# Patient Record
Sex: FEMALE | Race: WHITE | NOT HISPANIC OR LATINO | URBAN - METROPOLITAN AREA
[De-identification: names, ages, dates, MRNs, and addresses within clinical notes are randomized per-mention and may not be internally consistent; named-entity substitution may affect disease eponyms.]

---

## 2020-11-18 ENCOUNTER — OFFICE VISIT (OUTPATIENT)
Dept: URBAN - METROPOLITAN AREA CLINIC 70 | Facility: CLINIC | Age: 40
End: 2020-11-18
Payer: COMMERCIAL

## 2020-11-18 ENCOUNTER — WEB ENCOUNTER (OUTPATIENT)
Dept: URBAN - METROPOLITAN AREA CLINIC 70 | Facility: CLINIC | Age: 40
End: 2020-11-18

## 2020-11-18 DIAGNOSIS — R11.0 NAUSEA ALONE: ICD-10-CM

## 2020-11-18 DIAGNOSIS — R19.7 DIARRHEA: ICD-10-CM

## 2020-11-18 DIAGNOSIS — R79.89 ELEVATED LFTS: ICD-10-CM

## 2020-11-18 PROCEDURE — G8427 DOCREV CUR MEDS BY ELIG CLIN: HCPCS | Performed by: REGISTERED NURSE

## 2020-11-18 PROCEDURE — G8417 CALC BMI ABV UP PARAM F/U: HCPCS | Performed by: REGISTERED NURSE

## 2020-11-18 PROCEDURE — G8482 FLU IMMUNIZE ORDER/ADMIN: HCPCS | Performed by: REGISTERED NURSE

## 2020-11-18 PROCEDURE — 99204 OFFICE O/P NEW MOD 45 MIN: CPT | Performed by: REGISTERED NURSE

## 2020-11-18 PROCEDURE — 4004F PT TOBACCO SCREEN RCVD TLK: CPT | Performed by: REGISTERED NURSE

## 2020-11-18 RX ORDER — PANTOPRAZOLE SODIUM 40 MG/1
1 TABLET TABLET, DELAYED RELEASE ORAL ONCE A DAY
Qty: 90 | Refills: 3 | OUTPATIENT
Start: 2020-11-18

## 2020-11-18 NOTE — HPI-TODAY'S VISIT:
Pt presents for evaluation of elevated LFTs and hepatitis C. Labs on 9/28/20 show Total Bilirubin 0.8, alkaline phosphatase 108, , and .  Additional labs on 10/13/20 showing positive Hep C Ab with confirmed positive RNA and genotype 3. Repeat LFTs had improved with ALT 89 and AST 75. Pt reports moderate alcohol use. She used IV drugs in the past. No drug use for >5 years.  She also reports diarrhea for the past 1 year. Has 3-5 loose BMs/day. No BRBPR or melena. No prior evaluation for this. No new meds or foreign travel.

## 2020-12-15 LAB
ACTIN (SMOOTH MUSCLE) ANTIBODY: 10
ALBUMIN: 4.3
ALKALINE PHOSPHATASE: 84
ALPHA-1-ANTITRYPSIN, SERUM: 125
ALT (SGPT): 131
ANTINUCLEAR ANTIBODIES, IFA: NEGATIVE
AST (SGOT): 138
BILIRUBIN, DIRECT: 0.15
BILIRUBIN, TOTAL: 0.5
C DIFFICILE TOXINS A+B, EIA: NEGATIVE
C-REACTIVE PROTEIN, QUANT: 6
CAMPYLOBACTER CULTURE: (no result)
CERULOPLASMIN: 31.6
E COLI SHIGA TOXIN EIA: NEGATIVE
ENDOMYSIAL ANTIBODY IGA: NEGATIVE
FERRITIN, SERUM: 147
H. PYLORI, IGG ABS: 0.3
HBSAG SCREEN: NEGATIVE
HCV LOG10: 6.58
HEP A AB, TOTAL: POSITIVE
HEPATITIS C QUANTITATION: (no result)
IMMUNOGLOBULIN A, QN, SERUM: 148
IRON BIND.CAP.(TIBC): 302
IRON SATURATION: 35
IRON: 106
Lab: (no result)
MITOCHONDRIAL (M2) ANTIBODY: <20
OVA + PARASITE EXAM: (no result)
PANCREATIC ELASTASE, FECAL: 134
PROTEIN, TOTAL: 7.7
SALMONELLA/SHIGELLA SCREEN: (no result)
T-TRANSGLUTAMINASE (TTG) IGA: <2
TEST INFORMATION:: (no result)
UIBC: 196
WHITE BLOOD CELLS (WBC), STOOL: (no result)

## 2020-12-17 ENCOUNTER — TELEPHONE ENCOUNTER (OUTPATIENT)
Dept: URBAN - METROPOLITAN AREA CLINIC 92 | Facility: CLINIC | Age: 40
End: 2020-12-17

## 2020-12-17 RX ORDER — PANCRELIPASE 24000; 76000; 120000 [USP'U]/1; [USP'U]/1; [USP'U]/1
AS DIRECTED CAPSULE, DELAYED RELEASE PELLETS ORAL
Qty: 240 | Refills: 2 | OUTPATIENT
Start: 2020-12-17 | End: 2021-03-17

## 2020-12-17 RX ORDER — PANTOPRAZOLE SODIUM 40 MG/1
1 TABLET TABLET, DELAYED RELEASE ORAL ONCE A DAY
Qty: 90 | Refills: 3 | Status: ACTIVE | COMMUNITY
Start: 2020-11-18

## 2021-01-04 ENCOUNTER — OFFICE VISIT (OUTPATIENT)
Dept: URBAN - METROPOLITAN AREA CLINIC 70 | Facility: CLINIC | Age: 41
End: 2021-01-04

## 2021-01-11 ENCOUNTER — OFFICE VISIT (OUTPATIENT)
Dept: URBAN - METROPOLITAN AREA CLINIC 70 | Facility: CLINIC | Age: 41
End: 2021-01-11

## 2021-01-13 ENCOUNTER — OFFICE VISIT (OUTPATIENT)
Dept: URBAN - METROPOLITAN AREA CLINIC 70 | Facility: CLINIC | Age: 41
End: 2021-01-13
Payer: COMMERCIAL

## 2021-01-13 VITALS
WEIGHT: 287 LBS | HEIGHT: 66 IN | SYSTOLIC BLOOD PRESSURE: 155 MMHG | BODY MASS INDEX: 46.12 KG/M2 | TEMPERATURE: 97.7 F | DIASTOLIC BLOOD PRESSURE: 100 MMHG | HEART RATE: 114 BPM

## 2021-01-13 DIAGNOSIS — R79.89 ELEVATED LFTS: ICD-10-CM

## 2021-01-13 DIAGNOSIS — K86.81 EXOCRINE PANCREATIC INSUFFICIENCY: ICD-10-CM

## 2021-01-13 PROCEDURE — G8427 DOCREV CUR MEDS BY ELIG CLIN: HCPCS | Performed by: REGISTERED NURSE

## 2021-01-13 PROCEDURE — G8417 CALC BMI ABV UP PARAM F/U: HCPCS | Performed by: REGISTERED NURSE

## 2021-01-13 PROCEDURE — 4004F PT TOBACCO SCREEN RCVD TLK: CPT | Performed by: REGISTERED NURSE

## 2021-01-13 PROCEDURE — 99214 OFFICE O/P EST MOD 30 MIN: CPT | Performed by: REGISTERED NURSE

## 2021-01-13 PROCEDURE — G8483 FLU IMM NO ADMIN DOC REA: HCPCS | Performed by: REGISTERED NURSE

## 2021-01-13 RX ORDER — PANTOPRAZOLE SODIUM 40 MG/1
1 TABLET TABLET, DELAYED RELEASE ORAL ONCE A DAY
Qty: 90 | Refills: 3 | Status: ACTIVE | COMMUNITY
Start: 2020-11-18

## 2021-01-13 RX ORDER — PANCRELIPASE 24000; 76000; 120000 [USP'U]/1; [USP'U]/1; [USP'U]/1
AS DIRECTED CAPSULE, DELAYED RELEASE PELLETS ORAL
Qty: 240 | Refills: 2 | Status: ACTIVE | COMMUNITY
Start: 2020-12-17 | End: 2021-03-17

## 2021-01-13 RX ORDER — PANCRELIPASE 24000; 76000; 120000 [USP'U]/1; [USP'U]/1; [USP'U]/1
AS DIRECTED CAPSULE, DELAYED RELEASE PELLETS ORAL
Qty: 240 CAPSULE | Refills: 6 | OUTPATIENT

## 2021-01-13 NOTE — HPI-TODAY'S VISIT:
Note from OV 11/18/20: Pt presents for evaluation of elevated LFTs and hepatitis C. Labs on 9/28/20 show Total Bilirubin 0.8, alkaline phosphatase 108, , and .  Additional labs on 10/13/20 showing positive Hep C Ab with confirmed positive RNA and genotype 3. Repeat LFTs had improved with ALT 89 and AST 75. Pt reports moderate alcohol use. She used IV drugs in the past. No drug use for >5 years.  She also reports diarrhea for the past 1 year. Has 3-5 loose BMs/day. No BRBPR or melena. No prior evaluation for this. No new meds or foreign travel. TODAY: Stool studies negative for infectious process but findings consistent with pancreatic insufficiency. Diarrhea has improved with Creon. She has reduced her alcohol intake. Is drinking every couple of days.

## 2021-04-13 ENCOUNTER — OFFICE VISIT (OUTPATIENT)
Dept: URBAN - METROPOLITAN AREA CLINIC 70 | Facility: CLINIC | Age: 41
End: 2021-04-13

## 2023-09-12 ENCOUNTER — WEB ENCOUNTER (OUTPATIENT)
Dept: URBAN - METROPOLITAN AREA CLINIC 70 | Facility: CLINIC | Age: 43
End: 2023-09-12

## 2023-09-15 ENCOUNTER — LAB OUTSIDE AN ENCOUNTER (OUTPATIENT)
Dept: URBAN - METROPOLITAN AREA CLINIC 70 | Facility: CLINIC | Age: 43
End: 2023-09-15

## 2023-09-15 ENCOUNTER — WEB ENCOUNTER (OUTPATIENT)
Dept: URBAN - METROPOLITAN AREA CLINIC 70 | Facility: CLINIC | Age: 43
End: 2023-09-15

## 2023-09-15 ENCOUNTER — OFFICE VISIT (OUTPATIENT)
Dept: URBAN - METROPOLITAN AREA CLINIC 70 | Facility: CLINIC | Age: 43
End: 2023-09-15
Payer: COMMERCIAL

## 2023-09-15 VITALS
TEMPERATURE: 98 F | BODY MASS INDEX: 47.09 KG/M2 | WEIGHT: 293 LBS | SYSTOLIC BLOOD PRESSURE: 133 MMHG | DIASTOLIC BLOOD PRESSURE: 82 MMHG | HEIGHT: 66 IN | HEART RATE: 103 BPM

## 2023-09-15 DIAGNOSIS — K86.89 PANCREATIC INSUFFICIENCY: ICD-10-CM

## 2023-09-15 DIAGNOSIS — B18.2 CHRONIC ACTIVE HEPATITIS C: ICD-10-CM

## 2023-09-15 PROBLEM — 708198006: Status: ACTIVE | Noted: 2023-09-15

## 2023-09-15 PROCEDURE — 99214 OFFICE O/P EST MOD 30 MIN: CPT | Performed by: NURSE PRACTITIONER

## 2023-09-15 RX ORDER — PANTOPRAZOLE SODIUM 40 MG/1
1 TABLET TABLET, DELAYED RELEASE ORAL ONCE A DAY
Qty: 90 | Refills: 3 | Status: ON HOLD | COMMUNITY
Start: 2020-11-18

## 2023-09-15 RX ORDER — TIZANIDINE 4 MG/1
TABLET ORAL
Qty: 30 EACH | Refills: 0 | COMMUNITY

## 2023-09-15 RX ORDER — CELECOXIB 200 MG/1
TAKE 1 CAPSULE BY MOUTH EVERY DAY AS NEEDED CAPSULE ORAL
Qty: 30 EACH | Refills: 1 | COMMUNITY

## 2023-09-15 RX ORDER — GABAPENTIN 300 MG/1
CAPSULE ORAL
Qty: 60 CAPSULE | COMMUNITY

## 2023-09-15 RX ORDER — ACETAMINOPHEN AND CODEINE 300; 30 MG/1; MG/1
TAKE 1 TABLET BY MOUTH EVERY 6 HOURS AS NEEDED FOR SEVERE PAIN TABLET ORAL
Qty: 90 EACH | Refills: 0 | COMMUNITY

## 2023-09-15 RX ORDER — PANCRELIPASE 24000; 76000; 120000 [USP'U]/1; [USP'U]/1; [USP'U]/1
AS DIRECTED CAPSULE, DELAYED RELEASE PELLETS ORAL
Qty: 240 CAPSULE | Refills: 6 | Status: ON HOLD | COMMUNITY

## 2023-09-15 RX ORDER — PANCRELIPASE 36000; 180000; 114000 [USP'U]/1; [USP'U]/1; [USP'U]/1
AS DIRECTED CAPSULE, DELAYED RELEASE PELLETS ORAL
Qty: 240 CAPSULE | Refills: 11

## 2023-09-15 RX ORDER — AMLODIPINE BESYLATE 10 MG/1
TAKE 1 TABLET BY MOUTH EVERY DAY TABLET ORAL
Qty: 30 EACH | Refills: 3 | COMMUNITY

## 2023-09-15 RX ORDER — LISINOPRIL AND HYDROCHLOROTHIAZIDE 25; 20 MG/1; MG/1
TAKE 1 TABLET BY MOUTH EVERY DAY TABLET ORAL
Qty: 30 EACH | Refills: 3 | COMMUNITY

## 2023-09-15 NOTE — HPI-TODAY'S VISIT:
Note from OV 11/18/20 Mary Jane Quispe NP: Pt presents for evaluation of elevated LFTs and hepatitis C. Labs on 9/28/20 show Total Bilirubin 0.8, alkaline phosphatase 108, , and .  Additional labs on 10/13/20 showing positive Hep C Ab with confirmed positive RNA and genotype 3. Repeat LFTs had improved with ALT 89 and AST 75. Pt reports moderate alcohol use. She used IV drugs in the past. No drug use for >5 years.  She also reports diarrhea for the past 1 year. Has 3-5 loose BMs/day. No BRBPR or melena. No prior evaluation for this. No new meds or foreign travel. ---------------------------------- OV 1/13/21Mary Jane Quispe NP:Stool studies negative for infectious process but findings consistent with pancreatic insufficiency. Diarrhea has improved with Creon. She has reduced her alcohol intake. Is drinking every couple of days. ----------------------------------- Today 9/15/23: Patient presents today for Hep C. She is previously known to our group due to known Hep C and pancreatic insufficiency. She was unable to start treat for Hep C in 2021 due to loss of insurance. No alcohol use is 8 months. Diarrhea was previously controlled with creon but has reocurred with being of medication. No new GI complaints. Denies abdominal pain, wt loss, N/v, jaundice, constipation, or signs of GI bleeding.

## 2023-10-24 ENCOUNTER — OFFICE VISIT (OUTPATIENT)
Dept: URBAN - METROPOLITAN AREA CLINIC 70 | Facility: CLINIC | Age: 43
End: 2023-10-24
Payer: COMMERCIAL

## 2023-10-24 VITALS
HEART RATE: 105 BPM | DIASTOLIC BLOOD PRESSURE: 92 MMHG | HEIGHT: 66 IN | TEMPERATURE: 97.8 F | WEIGHT: 293 LBS | BODY MASS INDEX: 47.09 KG/M2 | SYSTOLIC BLOOD PRESSURE: 142 MMHG

## 2023-10-24 DIAGNOSIS — B18.2 CHRONIC ACTIVE HEPATITIS C: ICD-10-CM

## 2023-10-24 DIAGNOSIS — K86.89 PANCREATIC INSUFFICIENCY: ICD-10-CM

## 2023-10-24 LAB
A/G RATIO: 1.3
ALBUMIN: 4.4
ALKALINE PHOSPHATASE: 67
ALT (SGPT): 28
AST (SGOT): 30
BILIRUBIN, TOTAL: 0.4
BUN/CREATININE RATIO: (no result)
BUN: 18
CALCIUM: 9.2
CARBON DIOXIDE, TOTAL: 20
CHLORIDE: 102
CREATININE: 0.85
EGFR: 86
GLOBULIN, TOTAL: 3.3
GLUCOSE: 90
HCV RNA, QUANTITATIVE: (no result)
HCV RNA, QUANTITATIVE: 4.54
HEMATOCRIT: 42
HEMOGLOBIN: 15.2
HEPATITIS C VIRAL RNA: 3
INR: 1
MCH: 35.7
MCHC: 36.2
MCV: 98.6
MPV: 9.5
PLATELET COUNT: 237
POTASSIUM: 3.8
PROTEIN, TOTAL: 7.7
PT: 10.3
RDW: 12
RED BLOOD CELL COUNT: 4.26
SODIUM: 135
WHITE BLOOD CELL COUNT: 8.3

## 2023-10-24 PROCEDURE — 99214 OFFICE O/P EST MOD 30 MIN: CPT | Performed by: NURSE PRACTITIONER

## 2023-10-24 RX ORDER — AMLODIPINE BESYLATE 10 MG/1
TAKE 1 TABLET BY MOUTH EVERY DAY TABLET ORAL
Qty: 30 EACH | Refills: 3 | Status: ACTIVE | COMMUNITY

## 2023-10-24 RX ORDER — TIZANIDINE 4 MG/1
TABLET ORAL
Qty: 30 EACH | Refills: 0 | Status: ACTIVE | COMMUNITY

## 2023-10-24 RX ORDER — PANCRELIPASE 36000; 180000; 114000 [USP'U]/1; [USP'U]/1; [USP'U]/1
AS DIRECTED CAPSULE, DELAYED RELEASE PELLETS ORAL
OUTPATIENT

## 2023-10-24 RX ORDER — GABAPENTIN 300 MG/1
CAPSULE ORAL
Qty: 60 CAPSULE | Status: ACTIVE | COMMUNITY

## 2023-10-24 RX ORDER — LISINOPRIL AND HYDROCHLOROTHIAZIDE 25; 20 MG/1; MG/1
TAKE 1 TABLET BY MOUTH EVERY DAY TABLET ORAL
Qty: 30 EACH | Refills: 3 | Status: ACTIVE | COMMUNITY

## 2023-10-24 RX ORDER — CELECOXIB 200 MG/1
TAKE 1 CAPSULE BY MOUTH EVERY DAY AS NEEDED CAPSULE ORAL
Qty: 30 EACH | Refills: 1 | Status: ACTIVE | COMMUNITY

## 2023-10-24 RX ORDER — PANCRELIPASE 36000; 180000; 114000 [USP'U]/1; [USP'U]/1; [USP'U]/1
AS DIRECTED CAPSULE, DELAYED RELEASE PELLETS ORAL
Qty: 240 CAPSULE | Refills: 11 | Status: ACTIVE | COMMUNITY

## 2023-10-24 RX ORDER — ACETAMINOPHEN AND CODEINE 300; 30 MG/1; MG/1
TAKE 1 TABLET BY MOUTH EVERY 6 HOURS AS NEEDED FOR SEVERE PAIN TABLET ORAL
Qty: 90 EACH | Refills: 0 | Status: ACTIVE | COMMUNITY

## 2023-10-24 RX ORDER — PANTOPRAZOLE SODIUM 40 MG/1
1 TABLET TABLET, DELAYED RELEASE ORAL ONCE A DAY
Qty: 90 | Refills: 3 | Status: ON HOLD | COMMUNITY
Start: 2020-11-18

## 2023-10-24 NOTE — HPI-TODAY'S VISIT:
Note from OV 11/18/20 Mary Jane Quispe NP: Pt presents for evaluation of elevated LFTs and hepatitis C. Labs on 9/28/20 show Total Bilirubin 0.8, alkaline phosphatase 108, , and .  Additional labs on 10/13/20 showing positive Hep C Ab with confirmed positive RNA and genotype 3. Repeat LFTs had improved with ALT 89 and AST 75. Pt reports moderate alcohol use. She used IV drugs in the past. No drug use for >5 years.  She also reports diarrhea for the past 1 year. Has 3-5 loose BMs/day. No BRBPR or melena. No prior evaluation for this. No new meds or foreign travel. ---------------------------------- OV 1/13/21Mary Jane Quispe NP:Stool studies negative for infectious process but findings consistent with pancreatic insufficiency. Diarrhea has improved with Creon. She has reduced her alcohol intake. Is drinking every couple of days. ----------------------------------- OV 9/15/23: Patient presents today for Hep C. She is previously known to our group due to known Hep C and pancreatic insufficiency. She was unable to start treat for Hep C in 2021 due to loss of insurance. No alcohol use is 8 months. Diarrhea was previously controlled with creon but has reocurred with being of medication. No new GI complaints. Denies abdominal pain, wt loss, N/v, jaundice, constipation, or signs of GI bleeding. ---------------------------------- Today 10/24/23: Patient presents today for follow up. Labs ordered at last OV have been completed by patient but results not yet available. Abdominal u/s with elastograpy 10/4/23 showed fatty liver but no evidence of cirrhosis (normal to mild fibrosis; 4.9 kPa). Results discussed with kelly. Diarrhea improved on Creon. No new GI complaints.

## 2023-10-25 ENCOUNTER — TELEPHONE ENCOUNTER (OUTPATIENT)
Dept: URBAN - METROPOLITAN AREA CLINIC 70 | Facility: CLINIC | Age: 43
End: 2023-10-25

## 2023-10-25 RX ORDER — GLECAPREVIR AND PIBRENTASVIR 40; 100 MG/1; MG/1
3 TABLETS TABLET, FILM COATED ORAL ONCE A DAY
Qty: 84 TABLET | Refills: 1 | OUTPATIENT
Start: 2023-10-25 | End: 2023-12-19

## 2023-10-26 ENCOUNTER — TELEPHONE ENCOUNTER (OUTPATIENT)
Dept: URBAN - METROPOLITAN AREA CLINIC 70 | Facility: CLINIC | Age: 43
End: 2023-10-26

## 2023-10-26 RX ORDER — VELPATASVIR AND SOFOSBUVIR 100; 400 MG/1; MG/1
1 TABLET TABLET, FILM COATED ORAL ONCE A DAY
Qty: 28 TABLET | Refills: 2 | OUTPATIENT
Start: 2023-10-26 | End: 2024-01-17

## 2023-10-30 ENCOUNTER — TELEPHONE ENCOUNTER (OUTPATIENT)
Dept: URBAN - METROPOLITAN AREA CLINIC 70 | Facility: CLINIC | Age: 43
End: 2023-10-30

## 2023-10-30 RX ORDER — VELPATASVIR AND SOFOSBUVIR 100; 400 MG/1; MG/1
1 TABLET TABLET, FILM COATED ORAL ONCE A DAY
Qty: 28 TABLET | Refills: 2
Start: 2023-10-26 | End: 2024-01-22

## 2023-12-18 ENCOUNTER — OFFICE VISIT (OUTPATIENT)
Dept: URBAN - METROPOLITAN AREA CLINIC 70 | Facility: CLINIC | Age: 43
End: 2023-12-18

## 2024-01-11 ENCOUNTER — OFFICE VISIT (OUTPATIENT)
Dept: URBAN - METROPOLITAN AREA CLINIC 70 | Facility: CLINIC | Age: 44
End: 2024-01-11

## 2024-01-19 ENCOUNTER — OFFICE VISIT (OUTPATIENT)
Dept: URBAN - METROPOLITAN AREA CLINIC 70 | Facility: CLINIC | Age: 44
End: 2024-01-19

## 2024-01-22 ENCOUNTER — DASHBOARD ENCOUNTERS (OUTPATIENT)
Age: 44
End: 2024-01-22

## 2024-01-22 ENCOUNTER — OFFICE VISIT (OUTPATIENT)
Dept: URBAN - METROPOLITAN AREA CLINIC 70 | Facility: CLINIC | Age: 44
End: 2024-01-22
Payer: COMMERCIAL

## 2024-01-22 VITALS
SYSTOLIC BLOOD PRESSURE: 150 MMHG | HEART RATE: 116 BPM | HEIGHT: 66 IN | WEIGHT: 293 LBS | TEMPERATURE: 97.7 F | DIASTOLIC BLOOD PRESSURE: 87 MMHG | BODY MASS INDEX: 47.09 KG/M2

## 2024-01-22 DIAGNOSIS — K86.89 PANCREATIC INSUFFICIENCY: ICD-10-CM

## 2024-01-22 DIAGNOSIS — B18.2 CHRONIC ACTIVE HEPATITIS C: ICD-10-CM

## 2024-01-22 PROCEDURE — 99214 OFFICE O/P EST MOD 30 MIN: CPT | Performed by: NURSE PRACTITIONER

## 2024-01-22 RX ORDER — VELPATASVIR AND SOFOSBUVIR 100; 400 MG/1; MG/1
1 TABLET TABLET, FILM COATED ORAL ONCE A DAY
Qty: 28 TABLET | Refills: 2 | Status: ACTIVE | COMMUNITY
Start: 2023-10-26 | End: 2024-01-22

## 2024-01-22 RX ORDER — PANCRELIPASE 36000; 180000; 114000 [USP'U]/1; [USP'U]/1; [USP'U]/1
AS DIRECTED CAPSULE, DELAYED RELEASE PELLETS ORAL
OUTPATIENT

## 2024-01-22 RX ORDER — AMLODIPINE BESYLATE 10 MG/1
TAKE 1 TABLET BY MOUTH EVERY DAY TABLET ORAL
Qty: 30 EACH | Refills: 3 | Status: ACTIVE | COMMUNITY

## 2024-01-22 RX ORDER — PANTOPRAZOLE SODIUM 40 MG/1
1 TABLET TABLET, DELAYED RELEASE ORAL ONCE A DAY
Qty: 90 | Refills: 3 | Status: ON HOLD | COMMUNITY
Start: 2020-11-18

## 2024-01-22 RX ORDER — TIZANIDINE 4 MG/1
TABLET ORAL
Qty: 30 EACH | Refills: 0 | Status: ON HOLD | COMMUNITY

## 2024-01-22 RX ORDER — ACETAMINOPHEN AND CODEINE 300; 30 MG/1; MG/1
TAKE 1 TABLET BY MOUTH EVERY 6 HOURS AS NEEDED FOR SEVERE PAIN TABLET ORAL
Qty: 90 EACH | Refills: 0 | Status: ACTIVE | COMMUNITY

## 2024-01-22 RX ORDER — CELECOXIB 200 MG/1
TAKE 1 CAPSULE BY MOUTH EVERY DAY AS NEEDED CAPSULE ORAL
Qty: 30 EACH | Refills: 1 | Status: ACTIVE | COMMUNITY

## 2024-01-22 RX ORDER — LISINOPRIL AND HYDROCHLOROTHIAZIDE 25; 20 MG/1; MG/1
TAKE 1 TABLET BY MOUTH EVERY DAY TABLET ORAL
Qty: 30 EACH | Refills: 3 | Status: ACTIVE | COMMUNITY

## 2024-01-22 RX ORDER — GABAPENTIN 300 MG/1
CAPSULE ORAL
Qty: 60 CAPSULE | Status: ACTIVE | COMMUNITY

## 2024-01-22 RX ORDER — PANCRELIPASE 36000; 180000; 114000 [USP'U]/1; [USP'U]/1; [USP'U]/1
AS DIRECTED CAPSULE, DELAYED RELEASE PELLETS ORAL
Status: ACTIVE | COMMUNITY

## 2024-01-22 NOTE — HPI-TODAY'S VISIT:
Note from OV 11/18/20 Mary Jane Quispe NP: Pt presents for evaluation of elevated LFTs and hepatitis C. Labs on 9/28/20 show Total Bilirubin 0.8, alkaline phosphatase 108, , and .  Additional labs on 10/13/20 showing positive Hep C Ab with confirmed positive RNA and genotype 3. Repeat LFTs had improved with ALT 89 and AST 75. Pt reports moderate alcohol use. She used IV drugs in the past. No drug use for >5 years.  She also reports diarrhea for the past 1 year. Has 3-5 loose BMs/day. No BRBPR or melena. No prior evaluation for this. No new meds or foreign travel. ---------------------------------- OV 1/13/21Mary Jane Quispe NP:Stool studies negative for infectious process but findings consistent with pancreatic insufficiency. Diarrhea has improved with Creon. She has reduced her alcohol intake. Is drinking every couple of days. ----------------------------------- OV 9/15/23: Patient presents today for Hep C. She is previously known to our group due to known Hep C and pancreatic insufficiency. She was unable to start treat for Hep C in 2021 due to loss of insurance. No alcohol use is 8 months. Diarrhea was previously controlled with creon but has reocurred with being of medication. No new GI complaints. Denies abdominal pain, wt loss, N/v, jaundice, constipation, or signs of GI bleeding. ---------------------------------- OV 10/24/23: Patient presents today for follow up. Labs ordered at last OV have been completed by patient but results not yet available. Abdominal u/s with elastograpy 10/4/23 showed fatty liver but no evidence of cirrhosis (normal to mild fibrosis; 4.9 kPa). Results discussed with kelly. Diarrhea improved on Creon. No new GI complaints. ---------------------------------- Today 1/22/24: Patient presents today for follow up. Currently taking Epclusa for hep C tx and tolerating medication well. No current complaints.

## 2024-01-28 LAB
HCV RNA, QUANTITATIVE: <1.18
HCV RNA, QUANTITATIVE: <15

## 2024-05-22 ENCOUNTER — OFFICE VISIT (OUTPATIENT)
Dept: URBAN - METROPOLITAN AREA CLINIC 70 | Facility: CLINIC | Age: 44
End: 2024-05-22

## 2024-06-05 ENCOUNTER — OFFICE VISIT (OUTPATIENT)
Dept: URBAN - METROPOLITAN AREA CLINIC 70 | Facility: CLINIC | Age: 44
End: 2024-06-05

## 2024-06-05 RX ORDER — TIZANIDINE 4 MG/1
TABLET ORAL
Qty: 30 EACH | Refills: 0 | Status: ON HOLD | COMMUNITY

## 2024-06-05 RX ORDER — PANTOPRAZOLE SODIUM 40 MG/1
1 TABLET TABLET, DELAYED RELEASE ORAL ONCE A DAY
Qty: 90 | Refills: 3 | Status: ON HOLD | COMMUNITY
Start: 2020-11-18

## 2024-06-05 RX ORDER — ACETAMINOPHEN AND CODEINE 300; 30 MG/1; MG/1
TAKE 1 TABLET BY MOUTH EVERY 6 HOURS AS NEEDED FOR SEVERE PAIN TABLET ORAL
Qty: 90 EACH | Refills: 0 | Status: ACTIVE | COMMUNITY

## 2024-06-05 RX ORDER — GABAPENTIN 300 MG/1
CAPSULE ORAL
Qty: 60 CAPSULE | Status: ACTIVE | COMMUNITY

## 2024-06-05 RX ORDER — PANCRELIPASE 36000; 180000; 114000 [USP'U]/1; [USP'U]/1; [USP'U]/1
AS DIRECTED CAPSULE, DELAYED RELEASE PELLETS ORAL
Status: ACTIVE | COMMUNITY

## 2024-06-05 RX ORDER — CELECOXIB 200 MG/1
TAKE 1 CAPSULE BY MOUTH EVERY DAY AS NEEDED CAPSULE ORAL
Qty: 30 EACH | Refills: 1 | Status: ACTIVE | COMMUNITY

## 2024-06-05 RX ORDER — AMLODIPINE BESYLATE 10 MG/1
TAKE 1 TABLET BY MOUTH EVERY DAY TABLET ORAL
Qty: 30 EACH | Refills: 3 | Status: ACTIVE | COMMUNITY

## 2024-06-05 RX ORDER — LISINOPRIL AND HYDROCHLOROTHIAZIDE 25; 20 MG/1; MG/1
TAKE 1 TABLET BY MOUTH EVERY DAY TABLET ORAL
Qty: 30 EACH | Refills: 3 | Status: ACTIVE | COMMUNITY

## 2024-06-07 ENCOUNTER — OFFICE VISIT (OUTPATIENT)
Dept: URBAN - METROPOLITAN AREA CLINIC 70 | Facility: CLINIC | Age: 44
End: 2024-06-07

## 2024-06-12 ENCOUNTER — LAB OUTSIDE AN ENCOUNTER (OUTPATIENT)
Dept: URBAN - METROPOLITAN AREA CLINIC 70 | Facility: CLINIC | Age: 44
End: 2024-06-12

## 2024-06-12 ENCOUNTER — TELEPHONE ENCOUNTER (OUTPATIENT)
Dept: URBAN - METROPOLITAN AREA CLINIC 70 | Facility: CLINIC | Age: 44
End: 2024-06-12

## 2024-06-12 ENCOUNTER — OFFICE VISIT (OUTPATIENT)
Dept: URBAN - METROPOLITAN AREA CLINIC 70 | Facility: CLINIC | Age: 44
End: 2024-06-12
Payer: COMMERCIAL

## 2024-06-12 VITALS
WEIGHT: 293 LBS | DIASTOLIC BLOOD PRESSURE: 84 MMHG | TEMPERATURE: 97.7 F | BODY MASS INDEX: 47.09 KG/M2 | SYSTOLIC BLOOD PRESSURE: 128 MMHG | HEART RATE: 111 BPM | HEIGHT: 66 IN

## 2024-06-12 DIAGNOSIS — R11.0 NAUSEA: ICD-10-CM

## 2024-06-12 DIAGNOSIS — K86.89 PANCREATIC INSUFFICIENCY: ICD-10-CM

## 2024-06-12 DIAGNOSIS — R10.10 UPPER ABDOMINAL PAIN: ICD-10-CM

## 2024-06-12 DIAGNOSIS — Z12.11 COLON CANCER SCREENING: ICD-10-CM

## 2024-06-12 DIAGNOSIS — B18.2 CHRONIC ACTIVE HEPATITIS C: ICD-10-CM

## 2024-06-12 DIAGNOSIS — K21.9 GASTROESOPHAGEAL REFLUX DISEASE, UNSPECIFIED WHETHER ESOPHAGITIS PRESENT: ICD-10-CM

## 2024-06-12 PROBLEM — 235595009: Status: ACTIVE | Noted: 2024-06-12

## 2024-06-12 PROCEDURE — 99214 OFFICE O/P EST MOD 30 MIN: CPT | Performed by: NURSE PRACTITIONER

## 2024-06-12 RX ORDER — PANCRELIPASE 36000; 180000; 114000 [USP'U]/1; [USP'U]/1; [USP'U]/1
AS DIRECTED CAPSULE, DELAYED RELEASE PELLETS ORAL
Status: ACTIVE | COMMUNITY

## 2024-06-12 RX ORDER — PANCRELIPASE 36000; 180000; 114000 [USP'U]/1; [USP'U]/1; [USP'U]/1
AS DIRECTED CAPSULE, DELAYED RELEASE PELLETS ORAL
Qty: 200 | Refills: 11

## 2024-06-12 RX ORDER — ACETAMINOPHEN AND CODEINE 300; 30 MG/1; MG/1
TAKE 1 TABLET BY MOUTH EVERY 6 HOURS AS NEEDED FOR SEVERE PAIN TABLET ORAL
Qty: 90 EACH | Refills: 0 | Status: ACTIVE | COMMUNITY

## 2024-06-12 RX ORDER — LISINOPRIL AND HYDROCHLOROTHIAZIDE 25; 20 MG/1; MG/1
TAKE 1 TABLET BY MOUTH EVERY DAY TABLET ORAL
Qty: 30 EACH | Refills: 3 | Status: ACTIVE | COMMUNITY

## 2024-06-12 RX ORDER — AMLODIPINE BESYLATE 10 MG/1
TAKE 1 TABLET BY MOUTH EVERY DAY TABLET ORAL
Qty: 30 EACH | Refills: 3 | Status: ACTIVE | COMMUNITY

## 2024-06-12 RX ORDER — TIZANIDINE 4 MG/1
TABLET ORAL
Qty: 30 EACH | Refills: 0 | Status: ON HOLD | COMMUNITY

## 2024-06-12 RX ORDER — GABAPENTIN 300 MG/1
CAPSULE ORAL
Qty: 60 CAPSULE | Status: ACTIVE | COMMUNITY

## 2024-06-12 RX ORDER — OMEPRAZOLE 40 MG/1
1 CAPSULE 30 MINUTES BEFORE MORNING MEAL CAPSULE, DELAYED RELEASE ORAL ONCE A DAY
Qty: 90 | Refills: 3 | OUTPATIENT
Start: 2024-06-12

## 2024-06-12 RX ORDER — PANTOPRAZOLE SODIUM 40 MG/1
1 TABLET TABLET, DELAYED RELEASE ORAL ONCE A DAY
Qty: 90 | Refills: 3 | Status: ON HOLD | COMMUNITY
Start: 2020-11-18

## 2024-06-12 RX ORDER — CELECOXIB 200 MG/1
TAKE 1 CAPSULE BY MOUTH EVERY DAY AS NEEDED CAPSULE ORAL
Qty: 30 EACH | Refills: 1 | Status: ACTIVE | COMMUNITY

## 2024-06-12 NOTE — HPI-TODAY'S VISIT:
Note from OV 11/18/20 Mary Jane Quispe NP: Pt presents for evaluation of elevated LFTs and hepatitis C. Labs on 9/28/20 show Total Bilirubin 0.8, alkaline phosphatase 108, , and .  Additional labs on 10/13/20 showing positive Hep C Ab with confirmed positive RNA and genotype 3. Repeat LFTs had improved with ALT 89 and AST 75. Pt reports moderate alcohol use. She used IV drugs in the past. No drug use for >5 years.  She also reports diarrhea for the past 1 year. Has 3-5 loose BMs/day. No BRBPR or melena. No prior evaluation for this. No new meds or foreign travel. ---------------------------------- OV 1/13/21Mary Jane Quispe NP:Stool studies negative for infectious process but findings consistent with pancreatic insufficiency. Diarrhea has improved with Creon. She has reduced her alcohol intake. Is drinking every couple of days. ----------------------------------- OV 9/15/23: Patient presents today for Hep C. She is previously known to our group due to known Hep C and pancreatic insufficiency. She was unable to start treat for Hep C in 2021 due to loss of insurance. No alcohol use is 8 months. Diarrhea was previously controlled with creon but has reocurred with being of medication. No new GI complaints. Denies abdominal pain, wt loss, N/v, jaundice, constipation, or signs of GI bleeding. ---------------------------------- OV 10/24/23: Patient presents today for follow up. Labs ordered at last OV have been completed by patient but results not yet available. Abdominal u/s with elastograpy 10/4/23 showed fatty liver but no evidence of cirrhosis (normal to mild fibrosis; 4.9 kPa). Results discussed with kelly. Diarrhea improved on Creon. No new GI complaints. ---------------------------------- OV 1/22/24: Patient presents today for follow up. Currently taking Epclusa for hep C tx and tolerating medication well. No current complaints. ------------------------------- Today 6/12/24: Patient presents today for evaluation of abdominal pain. She completed Hep C treatment. Reports intermittent upper abdominal pain that is generalized with associated nausea and rare vomiting. Admits to heartburn. No on PPI. Not longer taking creon due to cost with recurrent intermittent diarrhea. Denies fever, wt loss, dysphagia, constipation, or signs of GI bleeding.

## 2024-06-14 LAB
A/G RATIO: 1.5
ALBUMIN: 4.3
ALKALINE PHOSPHATASE: 58
ALT (SGPT): 16
AST (SGOT): 22
BILIRUBIN, TOTAL: 0.9
BUN/CREATININE RATIO: (no result)
BUN: 12
CALCIUM: 8.8
CARBON DIOXIDE, TOTAL: 19
CHLORIDE: 100
CREATININE: 0.76
EGFR: 98
GLOBULIN, TOTAL: 2.8
GLUCOSE: 107
HCV RNA, QUANTITATIVE: <1.18
HCV RNA, QUANTITATIVE: <15
HEMATOCRIT: 39.7
HEMOGLOBIN: 14.4
LIPASE: 45
MCH: 34.4
MCHC: 36.3
MCV: 95
MPV: 9.9
PLATELET COUNT: 196
POTASSIUM: 3.6
PROTEIN, TOTAL: 7.1
RDW: 12.5
RED BLOOD CELL COUNT: 4.18
SODIUM: 133
WHITE BLOOD CELL COUNT: 5.2

## 2024-07-24 ENCOUNTER — OFFICE VISIT (OUTPATIENT)
Dept: URBAN - METROPOLITAN AREA CLINIC 70 | Facility: CLINIC | Age: 44
End: 2024-07-24

## 2024-07-26 ENCOUNTER — OFFICE VISIT (OUTPATIENT)
Dept: URBAN - METROPOLITAN AREA CLINIC 70 | Facility: CLINIC | Age: 44
End: 2024-07-26
Payer: COMMERCIAL

## 2024-07-26 ENCOUNTER — LAB OUTSIDE AN ENCOUNTER (OUTPATIENT)
Dept: URBAN - METROPOLITAN AREA CLINIC 70 | Facility: CLINIC | Age: 44
End: 2024-07-26

## 2024-07-26 VITALS
SYSTOLIC BLOOD PRESSURE: 138 MMHG | HEIGHT: 66 IN | WEIGHT: 293 LBS | TEMPERATURE: 97.5 F | BODY MASS INDEX: 47.09 KG/M2 | HEART RATE: 103 BPM | DIASTOLIC BLOOD PRESSURE: 98 MMHG

## 2024-07-26 DIAGNOSIS — B18.2 CHRONIC ACTIVE HEPATITIS C: ICD-10-CM

## 2024-07-26 DIAGNOSIS — K21.9 GASTROESOPHAGEAL REFLUX DISEASE, UNSPECIFIED WHETHER ESOPHAGITIS PRESENT: ICD-10-CM

## 2024-07-26 DIAGNOSIS — K86.89 PANCREATIC INSUFFICIENCY: ICD-10-CM

## 2024-07-26 DIAGNOSIS — K52.89 OTHER AND UNSPECIFIED NONINFECTIOUS GASTROENTERITIS AND COLITIS: ICD-10-CM

## 2024-07-26 PROCEDURE — 99214 OFFICE O/P EST MOD 30 MIN: CPT | Performed by: NURSE PRACTITIONER

## 2024-07-26 RX ORDER — ACETAMINOPHEN AND CODEINE 300; 30 MG/1; MG/1
TAKE 1 TABLET BY MOUTH EVERY 6 HOURS AS NEEDED FOR SEVERE PAIN TABLET ORAL
Qty: 90 EACH | Refills: 0 | Status: ACTIVE | COMMUNITY

## 2024-07-26 RX ORDER — CELECOXIB 200 MG/1
TAKE 1 CAPSULE BY MOUTH EVERY DAY AS NEEDED CAPSULE ORAL
Qty: 30 EACH | Refills: 1 | Status: ACTIVE | COMMUNITY

## 2024-07-26 RX ORDER — PANCRELIPASE 36000; 180000; 114000 [USP'U]/1; [USP'U]/1; [USP'U]/1
AS DIRECTED CAPSULE, DELAYED RELEASE PELLETS ORAL
Qty: 200 | Refills: 11 | Status: ON HOLD | COMMUNITY

## 2024-07-26 RX ORDER — CHOLESTYRAMINE 4 G/9G
1 SCOOP POWDER, FOR SUSPENSION ORAL TWICE A DAY
Qty: 378 GRAM | Refills: 5 | OUTPATIENT
Start: 2024-07-26

## 2024-07-26 RX ORDER — OMEPRAZOLE 40 MG/1
1 CAPSULE 30 MINUTES BEFORE MORNING MEAL CAPSULE, DELAYED RELEASE ORAL ONCE A DAY
Qty: 90 | Refills: 3 | OUTPATIENT

## 2024-07-26 RX ORDER — GABAPENTIN 300 MG/1
CAPSULE ORAL
Qty: 60 CAPSULE | Status: ACTIVE | COMMUNITY

## 2024-07-26 RX ORDER — PANTOPRAZOLE SODIUM 40 MG/1
1 TABLET TABLET, DELAYED RELEASE ORAL ONCE A DAY
Qty: 90 | Refills: 3 | Status: ON HOLD | COMMUNITY
Start: 2020-11-18

## 2024-07-26 RX ORDER — OMEPRAZOLE 40 MG/1
1 CAPSULE 30 MINUTES BEFORE MORNING MEAL CAPSULE, DELAYED RELEASE ORAL ONCE A DAY
Qty: 90 | Refills: 3 | Status: ACTIVE | COMMUNITY
Start: 2024-06-12

## 2024-07-26 RX ORDER — LISINOPRIL AND HYDROCHLOROTHIAZIDE 25; 20 MG/1; MG/1
TAKE 1 TABLET BY MOUTH EVERY DAY TABLET ORAL
Qty: 30 EACH | Refills: 3 | Status: ACTIVE | COMMUNITY

## 2024-07-26 RX ORDER — AMLODIPINE BESYLATE 10 MG/1
TAKE 1 TABLET BY MOUTH EVERY DAY TABLET ORAL
Qty: 30 EACH | Refills: 3 | Status: ACTIVE | COMMUNITY

## 2024-07-26 RX ORDER — TIZANIDINE 4 MG/1
TABLET ORAL
Qty: 30 EACH | Refills: 0 | Status: ON HOLD | COMMUNITY

## 2024-07-26 NOTE — HPI-TODAY'S VISIT:
Note from OV 11/18/20 Mary Jane Quispe NP: Pt presents for evaluation of elevated LFTs and hepatitis C. Labs on 9/28/20 show Total Bilirubin 0.8, alkaline phosphatase 108, , and .  Additional labs on 10/13/20 showing positive Hep C Ab with confirmed positive RNA and genotype 3. Repeat LFTs had improved with ALT 89 and AST 75. Pt reports moderate alcohol use. She used IV drugs in the past. No drug use for >5 years.  She also reports diarrhea for the past 1 year. Has 3-5 loose BMs/day. No BRBPR or melena. No prior evaluation for this. No new meds or foreign travel. ---------------------------------- OV 1/13/21Mary Jane Quispe NP:Stool studies negative for infectious process but findings consistent with pancreatic insufficiency. Diarrhea has improved with Creon. She has reduced her alcohol intake. Is drinking every couple of days. ----------------------------------- OV 9/15/23: Patient presents today for Hep C. She is previously known to our group due to known Hep C and pancreatic insufficiency. She was unable to start treat for Hep C in 2021 due to loss of insurance. No alcohol use is 8 months. Diarrhea was previously controlled with creon but has reocurred with being of medication. No new GI complaints. Denies abdominal pain, wt loss, N/v, jaundice, constipation, or signs of GI bleeding. ---------------------------------- OV 10/24/23: Patient presents today for follow up. Labs ordered at last OV have been completed by patient but results not yet available. Abdominal u/s with elastograpy 10/4/23 showed fatty liver but no evidence of cirrhosis (normal to mild fibrosis; 4.9 kPa). Results discussed with kelly. Diarrhea improved on Creon. No new GI complaints. ---------------------------------- OV 1/22/24: Patient presents today for follow up. Currently taking Epclusa for hep C tx and tolerating medication well. No current complaints. ------------------------------- OV 6/12/24: Patient presents today for evaluation of abdominal pain. She completed Hep C treatment. Reports intermittent upper abdominal pain that is generalized with associated nausea and rare vomiting. Admits to heartburn. No on PPI. Not longer taking creon due to cost with recurrent intermittent diarrhea. Denies fever, wt loss, dysphagia, constipation, or signs of GI bleeding. ---------------------------- Today 7/26/24: Patient presents today for follow up. Labs showed HCV undetectable with no acute process (WBC, H/H, lipase, plt, LFTs WNL). Abdominal CT 6/17/24 showed no acute process with pancreas normal. Results discussed with patient. She reports still unable to get rx pancreatic enzymes with continued postprandial upper pain, nausea, and diarrhea. Heartburn improved on PPI. Denies fever, wt loss, dysphaiga, constipation, jaundice, or signs of GI bleeidng.

## 2024-08-19 ENCOUNTER — OFFICE VISIT (OUTPATIENT)
Dept: URBAN - METROPOLITAN AREA MEDICAL CENTER 42 | Facility: MEDICAL CENTER | Age: 44
End: 2024-08-19
Payer: COMMERCIAL

## 2024-08-19 DIAGNOSIS — R19.7 ACUTE DIARRHEA: ICD-10-CM

## 2024-08-19 DIAGNOSIS — R11.0 AM NAUSEA: ICD-10-CM

## 2024-08-19 PROCEDURE — 43235 EGD DIAGNOSTIC BRUSH WASH: CPT | Performed by: INTERNAL MEDICINE

## 2024-08-19 PROCEDURE — 45380 COLONOSCOPY AND BIOPSY: CPT | Performed by: INTERNAL MEDICINE

## 2024-08-19 RX ORDER — PANCRELIPASE 36000; 180000; 114000 [USP'U]/1; [USP'U]/1; [USP'U]/1
AS DIRECTED CAPSULE, DELAYED RELEASE PELLETS ORAL
Qty: 200 | Refills: 11 | Status: ON HOLD | COMMUNITY

## 2024-08-19 RX ORDER — GABAPENTIN 300 MG/1
CAPSULE ORAL
Qty: 60 CAPSULE | Status: ACTIVE | COMMUNITY

## 2024-08-19 RX ORDER — ACETAMINOPHEN AND CODEINE 300; 30 MG/1; MG/1
TAKE 1 TABLET BY MOUTH EVERY 6 HOURS AS NEEDED FOR SEVERE PAIN TABLET ORAL
Qty: 90 EACH | Refills: 0 | Status: ACTIVE | COMMUNITY

## 2024-08-19 RX ORDER — PANTOPRAZOLE SODIUM 40 MG/1
1 TABLET TABLET, DELAYED RELEASE ORAL ONCE A DAY
Qty: 90 | Refills: 3 | Status: ON HOLD | COMMUNITY
Start: 2020-11-18

## 2024-08-19 RX ORDER — LISINOPRIL AND HYDROCHLOROTHIAZIDE 25; 20 MG/1; MG/1
TAKE 1 TABLET BY MOUTH EVERY DAY TABLET ORAL
Qty: 30 EACH | Refills: 3 | Status: ACTIVE | COMMUNITY

## 2024-08-19 RX ORDER — CHOLESTYRAMINE 4 G/9G
1 SCOOP POWDER, FOR SUSPENSION ORAL TWICE A DAY
Qty: 378 GRAM | Refills: 5 | Status: ACTIVE | COMMUNITY
Start: 2024-07-26

## 2024-08-19 RX ORDER — AMLODIPINE BESYLATE 10 MG/1
TAKE 1 TABLET BY MOUTH EVERY DAY TABLET ORAL
Qty: 30 EACH | Refills: 3 | Status: ACTIVE | COMMUNITY

## 2024-08-19 RX ORDER — CELECOXIB 200 MG/1
TAKE 1 CAPSULE BY MOUTH EVERY DAY AS NEEDED CAPSULE ORAL
Qty: 30 EACH | Refills: 1 | Status: ACTIVE | COMMUNITY

## 2024-08-19 RX ORDER — TIZANIDINE 4 MG/1
TABLET ORAL
Qty: 30 EACH | Refills: 0 | Status: ON HOLD | COMMUNITY

## 2024-08-19 RX ORDER — OMEPRAZOLE 40 MG/1
1 CAPSULE 30 MINUTES BEFORE MORNING MEAL CAPSULE, DELAYED RELEASE ORAL ONCE A DAY
Qty: 90 | Refills: 3 | Status: ACTIVE | COMMUNITY

## 2024-09-27 ENCOUNTER — OFFICE VISIT (OUTPATIENT)
Dept: URBAN - METROPOLITAN AREA CLINIC 70 | Facility: CLINIC | Age: 44
End: 2024-09-27

## 2024-09-30 ENCOUNTER — OFFICE VISIT (OUTPATIENT)
Dept: URBAN - METROPOLITAN AREA CLINIC 70 | Facility: CLINIC | Age: 44
End: 2024-09-30